# Patient Record
Sex: FEMALE | Race: WHITE | NOT HISPANIC OR LATINO | URBAN - METROPOLITAN AREA
[De-identification: names, ages, dates, MRNs, and addresses within clinical notes are randomized per-mention and may not be internally consistent; named-entity substitution may affect disease eponyms.]

---

## 2017-02-01 ENCOUNTER — APPOINTMENT (RX ONLY)
Dept: RURAL CLINIC 4 | Facility: CLINIC | Age: 82
Setting detail: DERMATOLOGY
End: 2017-02-01

## 2017-02-01 DIAGNOSIS — D485 NEOPLASM OF UNCERTAIN BEHAVIOR OF SKIN: ICD-10-CM

## 2017-02-01 DIAGNOSIS — L71.8 OTHER ROSACEA: ICD-10-CM

## 2017-02-01 PROBLEM — D48.5 NEOPLASM OF UNCERTAIN BEHAVIOR OF SKIN: Status: ACTIVE | Noted: 2017-02-01

## 2017-02-01 PROCEDURE — ? PRESCRIPTION

## 2017-02-01 PROCEDURE — ? TREATMENT REGIMEN

## 2017-02-01 PROCEDURE — 11101: CPT

## 2017-02-01 PROCEDURE — 11100: CPT

## 2017-02-01 PROCEDURE — 99213 OFFICE O/P EST LOW 20 MIN: CPT | Mod: 25

## 2017-02-01 PROCEDURE — ? BIOPSY BY SHAVE METHOD

## 2017-02-01 RX ORDER — MUPIROCIN 20 MG/G
OINTMENT TOPICAL
Qty: 1 | Refills: 0 | Status: ERX | COMMUNITY
Start: 2017-02-01

## 2017-02-01 RX ADMIN — MUPIROCIN: 20 OINTMENT TOPICAL at 00:00

## 2017-02-01 ASSESSMENT — LOCATION DETAILED DESCRIPTION DERM
LOCATION DETAILED: RIGHT CENTRAL MALAR CHEEK
LOCATION DETAILED: LEFT INFERIOR CENTRAL MALAR CHEEK
LOCATION DETAILED: INFERIOR MID FOREHEAD
LOCATION DETAILED: RIGHT INFERIOR CENTRAL MALAR CHEEK

## 2017-02-01 ASSESSMENT — LOCATION SIMPLE DESCRIPTION DERM
LOCATION SIMPLE: INFERIOR FOREHEAD
LOCATION SIMPLE: RIGHT CHEEK
LOCATION SIMPLE: LEFT CHEEK

## 2017-02-01 ASSESSMENT — LOCATION ZONE DERM: LOCATION ZONE: FACE

## 2017-02-01 NOTE — PROCEDURE: TREATMENT REGIMEN
Detail Level: Generalized
Samples Given: Alcortin bid 10/5 mixed with cicaplast baume bid.
Plan: Discussed 5-fu vs. blue light treatments if biopsies show AK.

## 2017-02-01 NOTE — PROCEDURE: MIPS QUALITY
Quality 111:Pneumonia Vaccination Status For Older Adults: Pneumococcal Vaccination Previously Received
Quality 226: Preventive Care And Screening: Tobacco Use: Screening And Cessation Intervention: Patient screened for tobacco and never smoked
Quality 110: Preventive Care And Screening: Influenza Immunization: Influenza Immunization previously received during influenza season
Quality 131: Pain Assessment And Follow-Up: Pain assessment using a standardized tool is documented as negative, no follow-up plan required
Detail Level: Detailed
Quality 47: Advance Care Plan: Advance Care Planning discussed and documented in the medical record; patient did not wish or was not able to name a surrogate decision maker or provide an advance care plan.
Quality 130: Documentation Of Current Medications In The Medical Record: Current Medications Documented

## 2017-02-01 NOTE — PROCEDURE: BIOPSY BY SHAVE METHOD
Anesthesia Type: 1% lidocaine with epinephrine and a 1:10 solution of 8.4% sodium bicarbonate
Render Post-Care Instructions In Note?: no
Wound Care: Mupirocin
Additional Anesthesia Volume In Cc (Will Not Render If 0): 0
Lab Facility: [id number]
Consent: Written consent was obtained and risks were reviewed including but not limited to scarring, infection, bleeding, scabbing, incomplete removal, nerve damage and allergy to anesthesia.
Size Of Lesion In Cm: 0.7
Silver Nitrate Text: The wound bed was treated with silver nitrate after the biopsy was performed.
Hemostasis: Drysol and Electrocautery
Curettage Text: The wound bed was treated with curettage after the biopsy was performed.
Type Of Destruction Used: Curettage
Body Location Override (Optional - Billing Will Still Be Based On Selected Body Map Location If Applicable): Right cheek inferior
Biopsy Method: Dermablade
Detail Level: Detailed
Lab: [facility]
Electrodesiccation Text: The wound bed was treated with electrodesiccation after the biopsy was performed.
Notification Instructions: Patient will be notified of biopsy results. However, patient instructed to call the office if not contacted within 2 weeks.
Biopsy Type: H and E
Anesthesia Volume In Cc (Will Not Render If 0): 0.5
Post-Care Instructions: I reviewed with the patient in detail post-care instructions. Patient is to keep the biopsy site dry overnight, and then apply bacitracin twice daily until healed. Patient may apply hydrogen peroxide soaks to remove any crusting.
Cryotherapy Text: The wound bed was treated with cryotherapy after the biopsy was performed.
Billing Type: [employer]
Electrodesiccation And Curettage Text: The wound bed was treated with electrodesiccation and curettage after the biopsy was performed.
Dressing: pressure dressing with telfa
Body Location Override (Optional - Billing Will Still Be Based On Selected Body Map Location If Applicable): Right cheek superior
Billing Type: Third-Party Bill
Lab: [id number]
Lab Facility: [id number]
Size Of Lesion In Cm: 0.6

## 2018-01-22 ENCOUNTER — NEW PATIENT COMPREHENSIVE (OUTPATIENT)
Dept: URBAN - METROPOLITAN AREA CLINIC 43 | Facility: CLINIC | Age: 83
End: 2018-01-22

## 2018-01-22 VITALS
DIASTOLIC BLOOD PRESSURE: 72 MMHG | RESPIRATION RATE: 14 BRPM | SYSTOLIC BLOOD PRESSURE: 160 MMHG | HEART RATE: 70 BPM | HEIGHT: 55 IN

## 2018-01-22 DIAGNOSIS — Z96.1: ICD-10-CM

## 2018-01-22 DIAGNOSIS — H04.123: ICD-10-CM

## 2018-01-22 DIAGNOSIS — H35.3131: ICD-10-CM

## 2018-01-22 DIAGNOSIS — H18.59: ICD-10-CM

## 2018-01-22 PROCEDURE — 92025 CPTRIZED CORNEAL TOPOGRAPHY: CPT

## 2018-01-22 PROCEDURE — 99204 OFFICE O/P NEW MOD 45 MIN: CPT

## 2018-01-22 PROCEDURE — 92134 CPTRZ OPH DX IMG PST SGM RTA: CPT

## 2018-01-22 PROCEDURE — 92015 DETERMINE REFRACTIVE STATE: CPT

## 2018-01-22 PROCEDURE — 83861 MICROFLUID ANALY TEARS: CPT

## 2018-01-22 RX ORDER — PREDNISOLONE ACETATE 10 MG/ML: 1 SUSPENSION/ DROPS OPHTHALMIC

## 2018-01-22 RX ORDER — MOXIFLOXACIN HYDROCHLORIDE 5 MG/ML: 1 SOLUTION/ DROPS OPHTHALMIC

## 2018-01-22 ASSESSMENT — VISUAL ACUITY
OS_SC: J12
OD_SC: 20/80-1
OS_SC: 20/200
OS_CC: 20/400+1
OS_CC: J3
OD_SC: J10
OD_CC: J3
OD_CC: 20/200

## 2018-01-22 ASSESSMENT — TONOMETRY
OS_IOP_MMHG: 15
OD_IOP_MMHG: 15

## 2018-02-07 ENCOUNTER — SURGERY/PROCEDURE (OUTPATIENT)
Dept: URBAN - METROPOLITAN AREA CLINIC 43 | Facility: CLINIC | Age: 83
End: 2018-02-07

## 2018-02-07 DIAGNOSIS — H18.59: ICD-10-CM

## 2018-02-07 PROCEDURE — 65435 CURETTE/TREAT CORNEA: CPT

## 2018-02-07 PROCEDURE — 65400 REMOVAL OF EYE LESION: CPT

## 2018-02-08 ENCOUNTER — POST-OP (OUTPATIENT)
Dept: URBAN - METROPOLITAN AREA CLINIC 43 | Facility: CLINIC | Age: 83
End: 2018-02-08

## 2018-02-08 DIAGNOSIS — Z98.890: ICD-10-CM

## 2018-02-08 PROCEDURE — 99024 POSTOP FOLLOW-UP VISIT: CPT

## 2018-02-08 ASSESSMENT — VISUAL ACUITY
OS_SC: 20/400
OD_SC: 20/50-1
OS_PH: 20/80-1

## 2018-02-15 ENCOUNTER — POST-OP (OUTPATIENT)
Dept: URBAN - METROPOLITAN AREA CLINIC 43 | Facility: CLINIC | Age: 83
End: 2018-02-15

## 2018-02-15 DIAGNOSIS — Z98.890: ICD-10-CM

## 2018-02-15 PROCEDURE — 99024 POSTOP FOLLOW-UP VISIT: CPT

## 2018-02-15 ASSESSMENT — VISUAL ACUITY
OD_SC: 20/50
OD_PH: 20/40

## 2018-03-02 ENCOUNTER — POST-OP (OUTPATIENT)
Dept: URBAN - METROPOLITAN AREA CLINIC 43 | Facility: CLINIC | Age: 83
End: 2018-03-02

## 2018-03-02 DIAGNOSIS — Z98.890: ICD-10-CM

## 2018-03-02 PROCEDURE — 99024 POSTOP FOLLOW-UP VISIT: CPT

## 2018-03-02 ASSESSMENT — VISUAL ACUITY: OD_SC: 20/40

## 2018-03-02 ASSESSMENT — TONOMETRY: OD_IOP_MMHG: 13

## 2018-03-20 ENCOUNTER — POST-OP (OUTPATIENT)
Dept: URBAN - METROPOLITAN AREA CLINIC 43 | Facility: CLINIC | Age: 83
End: 2018-03-20

## 2018-03-20 DIAGNOSIS — Z98.890: ICD-10-CM

## 2018-03-20 PROCEDURE — 99024 POSTOP FOLLOW-UP VISIT: CPT

## 2018-03-20 ASSESSMENT — VISUAL ACUITY
OD_SC: 20/50+2
OS_SC: 20/400

## 2018-03-20 ASSESSMENT — TONOMETRY
OD_IOP_MMHG: 13
OS_IOP_MMHG: 14

## 2018-03-29 ENCOUNTER — ESTABLISHED PATIENT (OUTPATIENT)
Dept: URBAN - METROPOLITAN AREA CLINIC 43 | Facility: CLINIC | Age: 83
End: 2018-03-29

## 2018-03-29 DIAGNOSIS — D49.2: ICD-10-CM

## 2018-03-29 PROCEDURE — 17000 DESTRUCT PREMALG LESION: CPT

## 2018-03-29 PROCEDURE — 99213 OFFICE O/P EST LOW 20 MIN: CPT

## 2018-03-29 PROCEDURE — 67810 INCAL BX EYELID SKN LID MRGN: CPT

## 2018-03-29 ASSESSMENT — VISUAL ACUITY
OS_SC: 20/400
OD_SC: 20/30-1